# Patient Record
Sex: FEMALE | Race: BLACK OR AFRICAN AMERICAN | NOT HISPANIC OR LATINO | URBAN - METROPOLITAN AREA
[De-identification: names, ages, dates, MRNs, and addresses within clinical notes are randomized per-mention and may not be internally consistent; named-entity substitution may affect disease eponyms.]

---

## 2017-11-27 ENCOUNTER — EMERGENCY (EMERGENCY)
Facility: HOSPITAL | Age: 47
LOS: 1 days | Discharge: HOME | End: 2017-11-27
Attending: EMERGENCY MEDICINE | Admitting: EMERGENCY MEDICINE
Payer: COMMERCIAL

## 2017-11-27 VITALS
HEART RATE: 90 BPM | TEMPERATURE: 97 F | WEIGHT: 169.98 LBS | OXYGEN SATURATION: 99 % | SYSTOLIC BLOOD PRESSURE: 134 MMHG | RESPIRATION RATE: 16 BRPM | DIASTOLIC BLOOD PRESSURE: 67 MMHG

## 2017-11-27 PROCEDURE — 99283 EMERGENCY DEPT VISIT LOW MDM: CPT | Mod: 25

## 2017-11-27 PROCEDURE — 99282 EMERGENCY DEPT VISIT SF MDM: CPT

## 2017-11-27 NOTE — ED ADULT NURSE NOTE - OBJECTIVE STATEMENT
pt. denies any physical complaints, denies any pain, pt. states she would like to speak to a physicist about how her body moves. pt. is calm, cooperative, communicates w/o difficulty, denies any suicidal or homicidal ideations, drug use or hallucinations.

## 2017-11-27 NOTE — ED ADULT TRIAGE NOTE - CHIEF COMPLAINT QUOTE
" I want to speak to  specialized in physics who can explained to me if it's possible colorful lights touched my body and now I'm feeling pulling sensation in my right nipple and vagina"

## 2017-11-27 NOTE — ED PROVIDER NOTE - PHYSICAL EXAMINATION
VITAL SIGNS: I have reviewed nursing notes and confirm.  CONSTITUTIONAL: Well-developed; well-nourished; in no acute distress.  SKIN: Agree with RN documentation regarding decubitus evaluation. Remainder of skin exam is warm and dry, no acute rash.  HEAD: Normocephalic; atraumatic.  EYES: PERRL, EOM intact; conjunctiva and sclera clear.  ENT: No nasal discharge; airway clear.  NECK: Supple; non tender.  CARD: S1, S2 normal; no murmurs, gallops, or rubs. Regular rate and rhythm.  RESP: No wheezes, rales or rhonchi.  ABD: Normal bowel sounds; soft; non-distended; non-tender  EXT: Normal ROM. No clubbing, cyanosis or edema.  LYMPH: No acute cervical adenopathy.  NEURO: Alert, oriented. Grossly unremarkable.  PSYCH: Cooperative, appropriate, no pressured speech or disorganized thought, possible exhibiting fixed delusion about quantum particles controlling her movements

## 2017-11-27 NOTE — ED ADULT NURSE NOTE - OTHER COMPLAINTS
pt refused to  provide any further information about her chief complains  and medical condition, states " I just want to see physics who can  explain to me about my sensation"

## 2017-11-27 NOTE — ED PROVIDER NOTE - MEDICAL DECISION MAKING DETAILS
pt refusing labs and psych eval, although exhibiting a fixed delusion, pt does not appear intoxicated, has capacity, not a danger to herself or others. Free to go and f/u as needed. Given return precautions.

## 2017-11-27 NOTE — ED PROVIDER NOTE - HISTORY ATTESTATION, MLM
[facility] [address], [facility] [phone] [facility]  [phone]               Thank you for choosing us for your health care visit with Cookie Corey MD.  We are glad to serve you and happy to provide you with t Allergies as of Feb 10, 2017     No Known Allergies                Today's Vital Signs     BP Height Weight BMI       112/66 mmHg 66\" 142 lb 22.93 kg/m2          Current Medications          This list is accurate as of: 2/10/17  2:33 PM.  [facility] I have reviewed and confirmed nurses' notes...

## 2017-11-27 NOTE — ED PROVIDER NOTE - OBJECTIVE STATEMENT
48 y/o F stating that she is feeling quantum movements in her body and would like to speak with a physicist regarding the mechanics of movement. Denies AH/VH. No SI/HI. Denies acute intoxication or drug use. No psych diagnosis or prior hospitalizations. Denies pain of any kind. No SOB, HA, visual sx, GI or  complaints. No fever/chills/cough.

## 2017-12-01 DIAGNOSIS — F22 DELUSIONAL DISORDERS: ICD-10-CM

## 2017-12-01 DIAGNOSIS — F17.200 NICOTINE DEPENDENCE, UNSPECIFIED, UNCOMPLICATED: ICD-10-CM

## 2021-12-23 NOTE — ED PROVIDER NOTE - NS ED MD DISPO DISCHARGE CCDA
Patient notified of Positive PCR test    Patient HAD COVID SYMPTOMS, and should follow the following recommendations:    1) Quarantine for 10 days from when symptoms first appeared.  2) Free of fever for 24 hours without using fever reducing medication.  3) Overall symptoms are improving    Patient's questions answered.   
Patient/Caregiver provided printed discharge information.